# Patient Record
Sex: MALE | URBAN - METROPOLITAN AREA
[De-identification: names, ages, dates, MRNs, and addresses within clinical notes are randomized per-mention and may not be internally consistent; named-entity substitution may affect disease eponyms.]

---

## 2021-06-09 ENCOUNTER — APPOINTMENT (OUTPATIENT)
Dept: URBAN - METROPOLITAN AREA CLINIC 254 | Age: 51
Setting detail: DERMATOLOGY
End: 2021-06-09

## 2021-06-09 VITALS — HEIGHT: 72 IN | RESPIRATION RATE: 16 BRPM | WEIGHT: 235 LBS

## 2021-06-09 DIAGNOSIS — L72.8 OTHER FOLLICULAR CYSTS OF THE SKIN AND SUBCUTANEOUS TISSUE: ICD-10-CM

## 2021-06-09 PROBLEM — D48.5 NEOPLASM OF UNCERTAIN BEHAVIOR OF SKIN: Status: ACTIVE | Noted: 2021-06-09

## 2021-06-09 PROCEDURE — OTHER COUNSELING: OTHER

## 2021-06-09 PROCEDURE — OTHER PATHOLOGY BILLING: OTHER

## 2021-06-09 PROCEDURE — 99203 OFFICE O/P NEW LOW 30 MIN: CPT | Mod: 25

## 2021-06-09 PROCEDURE — 11102 TANGNTL BX SKIN SINGLE LES: CPT

## 2021-06-09 PROCEDURE — OTHER SNIP BIOPSY: OTHER

## 2021-06-09 PROCEDURE — 88304 TISSUE EXAM BY PATHOLOGIST: CPT

## 2021-06-09 PROCEDURE — OTHER SEPARATE AND IDENTIFIABLE DOCUMENTATION: OTHER

## 2021-06-09 PROCEDURE — OTHER PRESCRIPTION: OTHER

## 2021-06-09 RX ORDER — MUPIROCIN 20 MG/G
OINTMENT TOPICAL QD
Qty: 1 | Refills: 0 | Status: ERX | COMMUNITY
Start: 2021-06-09

## 2021-06-09 ASSESSMENT — LOCATION SIMPLE DESCRIPTION DERM: LOCATION SIMPLE: RIGHT INFERIOR EYELID

## 2021-06-09 ASSESSMENT — LOCATION DETAILED DESCRIPTION DERM: LOCATION DETAILED: RIGHT LATERAL INFERIOR EYELID

## 2021-06-09 ASSESSMENT — LOCATION ZONE DERM: LOCATION ZONE: EYELID

## 2021-06-09 NOTE — PROCEDURE: PATHOLOGY BILLING
Rendering Text In Billing: The slides will be read by YASSSU and reported in the attached document Rendering Text In Billing: The slides will be read by Interview Rocket and reported in the attached document

## 2021-06-09 NOTE — PROCEDURE: SNIP BIOPSY
Electrodesiccation And Curettage Text: The wound bed was treated with electrodesiccation and curettage after the biopsy was performed.
Information: Selecting Yes will display possible errors in your note based on the variables you have selected. This validation is only offered as a suggestion for you. PLEASE NOTE THAT THE VALIDATION TEXT WILL BE REMOVED WHEN YOU FINALIZE YOUR NOTE. IF YOU WANT TO FAX A PRELIMINARY NOTE YOU WILL NEED TO TOGGLE THIS TO 'NO' IF YOU DO NOT WANT IT IN YOUR FAXED NOTE.
Bill 11047 For Specimen Handling/Conveyance To Laboratory?: no
Biopsy Type: H and E
Consent: Written consent was obtained and risks were reviewed including but not limited to scarring, infection, bleeding, scabbing, incomplete removal, nerve damage and allergy to anesthesia.
Notification Instructions: Patient will be notified of biopsy results. However, patient instructed to call the office if not contacted within 2 weeks.
Detail Level: Detailed
Anesthesia Volume In Cc (Will Not Render If 0): 0.3
X Size Of Lesion In Cm: 0
Billing Type: Client Bill
Hemostasis: Electrocautery
Post-Care Instructions: I reviewed with the patient in detail post-care instructions. Patient is to keep the biopsy site dry overnight, and then apply bacitracin twice daily until healed. Patient may apply hydrogen peroxide soaks to remove any crusting.
Electrodesiccation Text: The wound bed was treated with electrodesiccation after the biopsy was performed.
Type Of Destruction Used: Curettage
Dressing: bandage
Size Of Lesion In Cm: 0.6
Anesthesia Type: 1% lidocaine with epinephrine
Cryotherapy Text: The wound bed was treated with cryotherapy after the biopsy was performed.
Biopsy Method: scissors
Silver Nitrate Text: The wound bed was treated with silver nitrate after the biopsy was performed.
Wound Care: Petrolatum
Depth Of Biopsy: dermis
Curettage Text: The wound bed was treated with curettage after the biopsy was performed.

## 2021-06-09 NOTE — PROCEDURE: PATHOLOGY BILLING
Immunohistochemistry (91057 and 10008) billing is not performed here. Please use the Immunohistochemistry Stain Billing plan to accomplish this. Immunohistochemistry (97545 and [id number]) billing is not performed here. Please use the Immunohistochemistry Stain Billing plan to accomplish this.